# Patient Record
Sex: MALE | Race: WHITE | HISPANIC OR LATINO | Employment: OTHER | URBAN - METROPOLITAN AREA
[De-identification: names, ages, dates, MRNs, and addresses within clinical notes are randomized per-mention and may not be internally consistent; named-entity substitution may affect disease eponyms.]

---

## 2020-11-09 ENCOUNTER — OFFICE VISIT (OUTPATIENT)
Dept: PRIMARY CARE CLINIC | Facility: CLINIC | Age: 35
End: 2020-11-09

## 2020-11-09 VITALS
WEIGHT: 183 LBS | TEMPERATURE: 98 F | OXYGEN SATURATION: 99 % | SYSTOLIC BLOOD PRESSURE: 130 MMHG | HEIGHT: 68 IN | HEART RATE: 75 BPM | DIASTOLIC BLOOD PRESSURE: 80 MMHG | BODY MASS INDEX: 27.74 KG/M2 | RESPIRATION RATE: 18 BRPM

## 2020-11-09 DIAGNOSIS — Z11.4 SCREENING FOR HIV (HUMAN IMMUNODEFICIENCY VIRUS): ICD-10-CM

## 2020-11-09 DIAGNOSIS — Z13.6 ENCOUNTER FOR SCREENING FOR CARDIOVASCULAR DISORDERS: ICD-10-CM

## 2020-11-09 DIAGNOSIS — R06.09 EXERTIONAL DYSPNEA: ICD-10-CM

## 2020-11-09 DIAGNOSIS — R53.83 FATIGUE, UNSPECIFIED TYPE: Primary | ICD-10-CM

## 2020-11-09 DIAGNOSIS — Z11.59 NEED FOR HEPATITIS C SCREENING TEST: ICD-10-CM

## 2020-11-09 PROCEDURE — 93010 EKG 12-LEAD: ICD-10-PCS | Mod: S$PBB,,, | Performed by: INTERNAL MEDICINE

## 2020-11-09 PROCEDURE — 99999 PR PBB SHADOW E&M-EST. PATIENT-LVL III: CPT | Mod: PBBFAC,,, | Performed by: FAMILY MEDICINE

## 2020-11-09 PROCEDURE — 99999 PR PBB SHADOW E&M-EST. PATIENT-LVL III: ICD-10-PCS | Mod: PBBFAC,,, | Performed by: FAMILY MEDICINE

## 2020-11-09 PROCEDURE — 93005 ELECTROCARDIOGRAM TRACING: CPT | Mod: PBBFAC,PN | Performed by: INTERNAL MEDICINE

## 2020-11-09 PROCEDURE — 99213 OFFICE O/P EST LOW 20 MIN: CPT | Mod: PBBFAC,PN,25 | Performed by: FAMILY MEDICINE

## 2020-11-09 PROCEDURE — 93010 ELECTROCARDIOGRAM REPORT: CPT | Mod: S$PBB,,, | Performed by: INTERNAL MEDICINE

## 2020-11-09 PROCEDURE — 99203 OFFICE O/P NEW LOW 30 MIN: CPT | Mod: S$PBB,,, | Performed by: FAMILY MEDICINE

## 2020-11-09 PROCEDURE — 99203 PR OFFICE/OUTPT VISIT, NEW, LEVL III, 30-44 MIN: ICD-10-PCS | Mod: S$PBB,,, | Performed by: FAMILY MEDICINE

## 2020-11-09 NOTE — PROGRESS NOTES
"Subjective:       Patient ID: Luisito Perez is a 35 y.o. male.    Chief Complaint: Fatigue and Back Pain    Here for checkup, has been feeling fatigued and tired for the past 9 months, no energy. 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 Just wants to stay in bed all days. Gets tired and mildly short of breath with minimal activity, but denies CP. No recent illness or injury.     Review of Systems   Constitutional: Positive for diaphoresis and fatigue. Negative for appetite change, chills, fever and unexpected weight change.   HENT: Negative for trouble swallowing.    Eyes: Negative for visual disturbance.   Respiratory: Negative for cough, shortness of breath and wheezing.    Gastrointestinal: Negative for abdominal pain, blood in stool, diarrhea, nausea and vomiting.   Endocrine: Negative for polydipsia and polyuria.   Genitourinary: Negative for difficulty urinating.   Musculoskeletal: Positive for back pain.   Skin: Negative for rash.   Allergic/Immunologic: Negative for immunocompromised state.   Neurological: Positive for weakness. Negative for dizziness.   Hematological: Does not bruise/bleed easily.   Psychiatric/Behavioral: Negative for agitation and confusion.       Objective:      Vitals:    11/09/20 1016   BP: 130/80   BP Location: Left arm   Patient Position: Sitting   BP Method: Medium (Manual)   Pulse: 75   Resp: 18   Temp: 98.3 °F (36.8 °C)   TempSrc: Oral   SpO2: 99%   Weight: 83 kg (182 lb 15.7 oz)   Height: 5' 8" (1.727 m)     Physical Exam  Vitals signs and nursing note reviewed.   Constitutional:       Appearance: He is well-developed.   HENT:      Head: Normocephalic and atraumatic.   Eyes:      Pupils: Pupils are equal, round, and reactive to light.   Neck:      Musculoskeletal: Neck supple.      Vascular: No carotid bruit or JVD.   Cardiovascular:      Rate and Rhythm: Normal rate and regular rhythm.      Pulses:           Radial pulses are 2+ on the right side and 2+ " on the left side.      Heart sounds: Normal heart sounds.   Pulmonary:      Effort: Pulmonary effort is normal.      Breath sounds: Normal breath sounds.   Abdominal:      General: Bowel sounds are normal.      Palpations: Abdomen is soft.      Tenderness: There is no abdominal tenderness.   Musculoskeletal:      Lumbar back: He exhibits normal range of motion, no tenderness and no bony tenderness.   Skin:     General: Skin is warm and dry.   Neurological:      Mental Status: He is alert and oriented to person, place, and time.      Motor: Motor function is intact. No weakness.      Deep Tendon Reflexes:      Reflex Scores:       Patellar reflexes are 2+ on the right side and 2+ on the left side.  Psychiatric:         Mood and Affect: Mood normal.         Behavior: Behavior normal.         No results found for: WBC, HGB, HCT, PLT, CHOL, TRIG, HDL, LDLDIRECT, ALT, AST, NA, K, CL, CREATININE, BUN, CO2, TSH, PSA, INR, GLUF, HGBA1C, MICROALBUR   Assessment:       1. Fatigue, unspecified type    2. Encounter for screening for cardiovascular disorders    3. Need for hepatitis C screening test    4. Screening for HIV (human immunodeficiency virus)    5. Exertional dyspnea        Plan:       Fatigue, unspecified type  -     CBC Auto Differential; Future; Expected date: 11/09/2020  -     TSH; Future; Expected date: 11/09/2020  -     TESTOSTERONE PANEL; Future; Expected date: 11/09/2020  -     EKG 12-lead  -     X-Ray Chest PA And Lateral; Future; Expected date: 11/09/2020  Unclear etiology, needs further workup.  EKG normal.  Encounter for screening for cardiovascular disorders  -     Comprehensive Metabolic Panel; Future; Expected date: 11/09/2020  -     Lipid Panel; Future; Expected date: 11/09/2020    Need for hepatitis C screening test  -     Hepatitis C Antibody; Future; Expected date: 11/09/2020    Screening for HIV (human immunodeficiency virus)  -     HIV 1/2 Ag/Ab (4th Gen); Future; Expected date:  11/09/2020    Exertional dyspnea  -     EKG 12-lead  -     X-Ray Chest PA And Lateral; Future; Expected date: 11/09/2020

## 2020-11-16 ENCOUNTER — TELEPHONE (OUTPATIENT)
Dept: PRIMARY CARE CLINIC | Facility: CLINIC | Age: 35
End: 2020-11-16

## 2020-11-16 NOTE — TELEPHONE ENCOUNTER
----- Message from Dolly Rehman NP sent at 11/13/2020  9:35 AM CST -----  Please let patient know that blood work overall looks good.  Liver and kidney function is normal.  Cholesterol is elevated.  .  Not within treatment range.  Blood count is normal.  Thyroid is normal.  Still awaiting testosterone panel.

## 2023-05-05 ENCOUNTER — OFFICE VISIT (OUTPATIENT)
Dept: PRIMARY CARE CLINIC | Facility: CLINIC | Age: 38
End: 2023-05-05

## 2023-05-05 VITALS
HEIGHT: 68 IN | BODY MASS INDEX: 25.44 KG/M2 | HEART RATE: 60 BPM | SYSTOLIC BLOOD PRESSURE: 130 MMHG | TEMPERATURE: 98 F | DIASTOLIC BLOOD PRESSURE: 78 MMHG | WEIGHT: 167.88 LBS | RESPIRATION RATE: 18 BRPM | OXYGEN SATURATION: 97 %

## 2023-05-05 DIAGNOSIS — Z13.1 SCREENING FOR DIABETES MELLITUS: ICD-10-CM

## 2023-05-05 DIAGNOSIS — Z00.00 ANNUAL PHYSICAL EXAM: Primary | ICD-10-CM

## 2023-05-05 PROCEDURE — 99395 PREV VISIT EST AGE 18-39: CPT | Mod: S$PBB,,, | Performed by: FAMILY MEDICINE

## 2023-05-05 PROCEDURE — 99999 PR PBB SHADOW E&M-EST. PATIENT-LVL III: CPT | Mod: PBBFAC,,, | Performed by: FAMILY MEDICINE

## 2023-05-05 PROCEDURE — 99999 PR PBB SHADOW E&M-EST. PATIENT-LVL III: ICD-10-PCS | Mod: PBBFAC,,, | Performed by: FAMILY MEDICINE

## 2023-05-05 PROCEDURE — 99395 PR PREVENTIVE VISIT,EST,18-39: ICD-10-PCS | Mod: S$PBB,,, | Performed by: FAMILY MEDICINE

## 2023-05-05 PROCEDURE — 99213 OFFICE O/P EST LOW 20 MIN: CPT | Mod: PBBFAC,PN | Performed by: FAMILY MEDICINE

## 2023-05-05 NOTE — PROGRESS NOTES
"Subjective:       Patient ID: Luisito Perez is a 37 y.o. male.    Chief Complaint: Annual Exam    Luisito Perez is a 37 y.o. male seen today for a routine checkup. The patient has no specific complaints or concerns at this time.  No recent illness or injury.  No regular meds except vitamins    Review of Systems   Constitutional:  Negative for chills, fatigue and fever.   HENT:  Negative for congestion.    Eyes:  Negative for visual disturbance.   Respiratory:  Negative for cough and shortness of breath.    Cardiovascular:  Negative for chest pain.   Gastrointestinal:  Negative for abdominal pain, nausea and vomiting.   Genitourinary:  Negative for difficulty urinating.   Musculoskeletal:  Negative for arthralgias.   Skin:  Negative for rash.   Allergic/Immunologic: Negative for immunocompromised state.   Neurological:  Negative for dizziness.   Psychiatric/Behavioral:  Negative for sleep disturbance.      Objective:      Vitals:    05/05/23 1118   BP: 130/78   BP Location: Right arm   Patient Position: Sitting   BP Method: Medium (Manual)   Pulse: 60   Resp: 18   Temp: 97.6 °F (36.4 °C)   TempSrc: Temporal   SpO2: 97%   Weight: 76.1 kg (167 lb 14.1 oz)   Height: 5' 8" (1.727 m)     Physical Exam  Vitals and nursing note reviewed.   Constitutional:       General: He is not in acute distress.     Appearance: Normal appearance. He is well-developed.   HENT:      Head: Normocephalic and atraumatic.   Neck:      Vascular: No carotid bruit.   Cardiovascular:      Rate and Rhythm: Normal rate and regular rhythm.      Heart sounds: Normal heart sounds.   Pulmonary:      Effort: Pulmonary effort is normal.      Breath sounds: Normal breath sounds.   Abdominal:      General: Bowel sounds are normal. There is no distension.      Tenderness: There is no abdominal tenderness.   Musculoskeletal:      Right lower leg: No edema.      Left lower leg: No edema.   Skin:     General: Skin is warm and dry.   Neurological:      " Mental Status: He is alert and oriented to person, place, and time.   Psychiatric:         Mood and Affect: Mood normal.         Behavior: Behavior normal.       Lab Results   Component Value Date    WBC 10.00 11/13/2020    HGB 15.6 11/13/2020    HCT 44.7 11/13/2020     11/13/2020    CHOL 223 (H) 11/13/2020    TRIG 51 11/13/2020    HDL 68 11/13/2020    ALT 26 11/13/2020    AST 21 11/13/2020     11/13/2020    K 4.1 11/13/2020     11/13/2020    CREATININE 0.9 11/13/2020    BUN 13 11/13/2020    CO2 25 11/13/2020    TSH 2.10 11/13/2020      Assessment:       1. Annual physical exam    2. Screening for diabetes mellitus        Plan:       Annual physical exam  -     CBC Auto Differential; Future; Expected date: 05/05/2023  -     Comprehensive Metabolic Panel; Future; Expected date: 05/05/2023  -     Lipid Panel; Future; Expected date: 05/05/2023  -     Hemoglobin A1C; Future; Expected date: 05/05/2023    Screening for diabetes mellitus  -     Hemoglobin A1C; Future; Expected date: 05/05/2023

## 2024-12-09 ENCOUNTER — OFFICE VISIT (OUTPATIENT)
Dept: PRIMARY CARE CLINIC | Facility: CLINIC | Age: 39
End: 2024-12-09

## 2024-12-09 VITALS
TEMPERATURE: 97 F | HEIGHT: 68 IN | RESPIRATION RATE: 18 BRPM | HEART RATE: 82 BPM | DIASTOLIC BLOOD PRESSURE: 60 MMHG | OXYGEN SATURATION: 98 % | BODY MASS INDEX: 25.86 KG/M2 | WEIGHT: 170.63 LBS | SYSTOLIC BLOOD PRESSURE: 110 MMHG

## 2024-12-09 DIAGNOSIS — Z13.1 SCREENING FOR DIABETES MELLITUS: ICD-10-CM

## 2024-12-09 DIAGNOSIS — Z00.00 ANNUAL PHYSICAL EXAM: Primary | ICD-10-CM

## 2024-12-09 DIAGNOSIS — Z23 NEED FOR VACCINATION: ICD-10-CM

## 2024-12-09 PROCEDURE — 99214 OFFICE O/P EST MOD 30 MIN: CPT | Mod: PBBFAC,PN | Performed by: FAMILY MEDICINE

## 2024-12-09 PROCEDURE — 99396 PREV VISIT EST AGE 40-64: CPT | Mod: S$PBB,,, | Performed by: FAMILY MEDICINE

## 2024-12-09 PROCEDURE — 99999 PR PBB SHADOW E&M-EST. PATIENT-LVL IV: CPT | Mod: PBBFAC,,, | Performed by: FAMILY MEDICINE

## 2024-12-09 PROCEDURE — 90714 TD VACC NO PRESV 7 YRS+ IM: CPT | Mod: PBBFAC,PN

## 2024-12-09 PROCEDURE — 90471 IMMUNIZATION ADMIN: CPT | Mod: PBBFAC,PN

## 2024-12-09 PROCEDURE — 99999PBSHW PR PBB SHADOW TECHNICAL ONLY FILED TO HB: Mod: PBBFAC,,,

## 2024-12-09 RX ADMIN — CLOSTRIDIUM TETANI TOXOID ANTIGEN (FORMALDEHYDE INACTIVATED) AND CORYNEBACTERIUM DIPHTHERIAE TOXOID ANTIGEN (FORMALDEHYDE INACTIVATED) 0.5 ML: 5; 2 INJECTION, SUSPENSION INTRAMUSCULAR at 11:12

## 2024-12-09 NOTE — PROGRESS NOTES
Verified pt by name and . NKDA. Per physician orders pt was administered TD IM to left deltoid using aseptic technique. Pt tolerated well. No adverse effects or pain reported. MD notified.

## 2024-12-09 NOTE — PROGRESS NOTES
Assessment:       1. Annual physical exam    2. Screening for diabetes mellitus    3. Need for vaccination         Plan:       Annual physical exam  -     CBC Auto Differential; Future; Expected date: 12/09/2024  -     Comprehensive Metabolic Panel; Future; Expected date: 12/09/2024  -     Lipid Panel; Future; Expected date: 12/09/2024  -     Hemoglobin A1C; Future; Expected date: 12/09/2024    Screening for diabetes mellitus  -     Hemoglobin A1C; Future; Expected date: 12/09/2024    Need for vaccination  -     Td (Tenivac) IM vaccine (>/= 8 yo)      Assessment & Plan    - Assessed patient's reported right shoulder and neck pain extending to head  - Determined likely pulled muscle in neck based on physical exam and symptom improvement  - Judged condition as non-severe, requiring conservative management    CERVICALGIA (NECK PAIN):   Explained possible causes of neck and shoulder pain, including work-related strain and poor sleeping position.   Discussed conservative management techniques for muscle strain.   Patient to apply alternating ice and heat to affected area for pain relief.   Started ibuprofen or Advil as needed for pain.    ENCOUNTER FOR IMMUNIZATION:   Administered tetanus vaccine during visit.    GENERAL ADULT MEDICAL EXAMINATION:   Ordered fasting labs.   Complete fasting labs at lab later in the week or on weekend.    FOLLOW-UP:   Follow up in 1 year for annual follow-up if labs results are normal.             Subjective:    Patient ID: Luisito Perez is a 39 y.o. male.  Chief Complaint: Annual Exam    HPI  History of Present Illness    CHIEF COMPLAINT:  Patient presents today for an annual checkup.    MUSCULOSKELETAL:  He reports pain in the right shoulder extending to the neck and top of the head, which began approximately 1.5 weeks ago without known injury or accident. The pain is improving. He denies numbness, tingling in fingers, or weakness in upper extremities or legs. He previously attended  "the gym regularly but stopped about two months ago.    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    RECENT ILLNESS:  He reports a recent fever but denies persistent or recurrent fevers, chills, and night sweats.    REVIEW OF SYSTEMS:  He denies recent weight changes, chest pain, respiratory symptoms (coughing, wheezing, trouble breathing), GI issues, or bruising. He reports no known blood pressure issues.    FAMILY HISTORY:  He reports no known significant family health problems.    IMMUNIZATION HISTORY:  He reports infrequent flu vaccine administration and some COVID-19 vaccinations, but has no plans for additional doses. Tetanus vaccination status is unknown.    LABORATORY TESTS:  Last labs was performed approximately four years ago during his initial visit to the clinic. No labs have been conducted since that time.    SOCIAL HISTORY:  He has no children.      ROS:  Constitutional: +fevers, -chills, -night sweats, -change in weight  Respiratory: -shortness of breath, -cough, -wheezing  Cardiovascular: -chest pain  Musculoskeletal: +muscle pain  Neurological: -weakness, -numbness, -tingling       Review of Systems    Objective:      Vitals:    12/09/24 1048   BP: 110/60   BP Location: Left arm   Patient Position: Sitting   Pulse: 82   Resp: 18   Temp: 97.4 °F (36.3 °C)   TempSrc: Temporal   SpO2: 98%   Weight: 77.4 kg (170 lb 10.2 oz)   Height: 5' 8" (1.727 m)     BP Readings from Last 5 Encounters:   12/09/24 110/60   05/05/23 130/78   11/09/20 130/80     Wt Readings from Last 5 Encounters:   12/09/24 77.4 kg (170 lb 10.2 oz)   05/05/23 76.1 kg (167 lb 14.1 oz)   11/09/20 83 kg (182 lb 15.7 oz)     Physical Exam  Physical Exam    General: Well-developed. Well-nourished. No acute distress.  Eyes: EOMI. Sclerae anicteric.  HENT: Normocephalic. Atraumatic. Nares patent. Moist oral mucosa.  Cardiovascular: Regular rate. Regular rhythm. No murmurs. No rubs. No " gallops. Normal S1, S2.  Respiratory: Normal respiratory effort. Clear to auscultation bilaterally. No rales. No rhonchi. No wheezing.  Musculoskeletal: No  obvious deformity. Full range of motion in neck.  Extremities: No lower extremity edema.  Neurological: Alert & oriented x3. No slurred speech. Normal gait.  Psychiatric: Normal mood. Normal affect. Good insight. Good judgment.  Skin: Warm. Dry. No rash.  MSK: Shoulder - Right: Full range of motion in right shoulder.         Lab Results   Component Value Date    WBC 10.00 11/13/2020    HGB 15.6 11/13/2020    HCT 44.7 11/13/2020     11/13/2020    CHOL 223 (H) 11/13/2020    TRIG 51 11/13/2020    HDL 68 11/13/2020    ALT 26 11/13/2020    AST 21 11/13/2020     11/13/2020    K 4.1 11/13/2020     11/13/2020    CREATININE 0.9 11/13/2020    BUN 13 11/13/2020    CO2 25 11/13/2020    TSH 2.10 11/13/2020      This note was generated with the assistance of ambient listening technology. Verbal consent was obtained by the patient and accompanying visitor(s) for the recording of patient appointment to facilitate this note. I attest to having reviewed and edited the generated note for accuracy, though some syntax or spelling errors may persist. Please contact the author of this note for any clarification.